# Patient Record
Sex: MALE | Race: WHITE | NOT HISPANIC OR LATINO | Employment: FULL TIME | URBAN - METROPOLITAN AREA
[De-identification: names, ages, dates, MRNs, and addresses within clinical notes are randomized per-mention and may not be internally consistent; named-entity substitution may affect disease eponyms.]

---

## 2020-12-09 ENCOUNTER — HOSPITAL ENCOUNTER (EMERGENCY)
Facility: MEDICAL CENTER | Age: 30
End: 2020-12-09
Attending: EMERGENCY MEDICINE | Admitting: EMERGENCY MEDICINE
Payer: OTHER GOVERNMENT

## 2020-12-09 VITALS
RESPIRATION RATE: 18 BRPM | HEIGHT: 75 IN | OXYGEN SATURATION: 97 % | HEART RATE: 89 BPM | SYSTOLIC BLOOD PRESSURE: 122 MMHG | BODY MASS INDEX: 26.15 KG/M2 | WEIGHT: 210.32 LBS | DIASTOLIC BLOOD PRESSURE: 74 MMHG | TEMPERATURE: 99.7 F

## 2020-12-09 DIAGNOSIS — Z20.822 SUSPECTED 2019 NOVEL CORONAVIRUS INFECTION: ICD-10-CM

## 2020-12-09 DIAGNOSIS — R05.9 COUGH: ICD-10-CM

## 2020-12-09 LAB
ALBUMIN SERPL BCP-MCNC: 4.4 G/DL (ref 3.2–4.9)
ALBUMIN/GLOB SERPL: 1.7 G/DL
ALP SERPL-CCNC: 61 U/L (ref 30–99)
ALT SERPL-CCNC: 25 U/L (ref 2–50)
ANION GAP SERPL CALC-SCNC: 10 MMOL/L (ref 7–16)
AST SERPL-CCNC: 17 U/L (ref 12–45)
BASOPHILS # BLD AUTO: 0.7 % (ref 0–1.8)
BASOPHILS # BLD: 0.03 K/UL (ref 0–0.12)
BILIRUB SERPL-MCNC: 0.6 MG/DL (ref 0.1–1.5)
BUN SERPL-MCNC: 12 MG/DL (ref 8–22)
CALCIUM SERPL-MCNC: 9.2 MG/DL (ref 8.5–10.5)
CHLORIDE SERPL-SCNC: 100 MMOL/L (ref 96–112)
CO2 SERPL-SCNC: 27 MMOL/L (ref 20–33)
COVID ORDER STATUS COVID19: NORMAL
CREAT SERPL-MCNC: 0.91 MG/DL (ref 0.5–1.4)
EOSINOPHIL # BLD AUTO: 0.06 K/UL (ref 0–0.51)
EOSINOPHIL NFR BLD: 1.5 % (ref 0–6.9)
ERYTHROCYTE [DISTWIDTH] IN BLOOD BY AUTOMATED COUNT: 41.2 FL (ref 35.9–50)
GLOBULIN SER CALC-MCNC: 2.6 G/DL (ref 1.9–3.5)
GLUCOSE SERPL-MCNC: 95 MG/DL (ref 65–99)
HCT VFR BLD AUTO: 43.9 % (ref 42–52)
HGB BLD-MCNC: 15 G/DL (ref 14–18)
IMM GRANULOCYTES # BLD AUTO: 0.01 K/UL (ref 0–0.11)
IMM GRANULOCYTES NFR BLD AUTO: 0.2 % (ref 0–0.9)
LYMPHOCYTES # BLD AUTO: 0.54 K/UL (ref 1–4.8)
LYMPHOCYTES NFR BLD: 13.4 % (ref 22–41)
MCH RBC QN AUTO: 30.7 PG (ref 27–33)
MCHC RBC AUTO-ENTMCNC: 34.2 G/DL (ref 33.7–35.3)
MCV RBC AUTO: 90 FL (ref 81.4–97.8)
MONOCYTES # BLD AUTO: 0.83 K/UL (ref 0–0.85)
MONOCYTES NFR BLD AUTO: 20.6 % (ref 0–13.4)
NEUTROPHILS # BLD AUTO: 2.55 K/UL (ref 1.82–7.42)
NEUTROPHILS NFR BLD: 63.6 % (ref 44–72)
NRBC # BLD AUTO: 0 K/UL
NRBC BLD-RTO: 0 /100 WBC
PLATELET # BLD AUTO: 180 K/UL (ref 164–446)
PMV BLD AUTO: 10.8 FL (ref 9–12.9)
POTASSIUM SERPL-SCNC: 3.5 MMOL/L (ref 3.6–5.5)
PROT SERPL-MCNC: 7 G/DL (ref 6–8.2)
RBC # BLD AUTO: 4.88 M/UL (ref 4.7–6.1)
SALICYLATES SERPL-MCNC: <1 MG/DL (ref 15–25)
SARS-COV-2 RNA RESP QL NAA+PROBE: DETECTED
SODIUM SERPL-SCNC: 137 MMOL/L (ref 135–145)
SPECIMEN SOURCE: ABNORMAL
WBC # BLD AUTO: 4 K/UL (ref 4.8–10.8)

## 2020-12-09 PROCEDURE — 80307 DRUG TEST PRSMV CHEM ANLYZR: CPT

## 2020-12-09 PROCEDURE — 85025 COMPLETE CBC W/AUTO DIFF WBC: CPT

## 2020-12-09 PROCEDURE — 700111 HCHG RX REV CODE 636 W/ 250 OVERRIDE (IP): Performed by: EMERGENCY MEDICINE

## 2020-12-09 PROCEDURE — 80053 COMPREHEN METABOLIC PANEL: CPT

## 2020-12-09 PROCEDURE — 96374 THER/PROPH/DIAG INJ IV PUSH: CPT

## 2020-12-09 PROCEDURE — U0003 INFECTIOUS AGENT DETECTION BY NUCLEIC ACID (DNA OR RNA); SEVERE ACUTE RESPIRATORY SYNDROME CORONAVIRUS 2 (SARS-COV-2) (CORONAVIRUS DISEASE [COVID-19]), AMPLIFIED PROBE TECHNIQUE, MAKING USE OF HIGH THROUGHPUT TECHNOLOGIES AS DESCRIBED BY CMS-2020-01-R: HCPCS

## 2020-12-09 PROCEDURE — 99284 EMERGENCY DEPT VISIT MOD MDM: CPT

## 2020-12-09 PROCEDURE — 700105 HCHG RX REV CODE 258: Performed by: EMERGENCY MEDICINE

## 2020-12-09 PROCEDURE — C9803 HOPD COVID-19 SPEC COLLECT: HCPCS | Performed by: EMERGENCY MEDICINE

## 2020-12-09 RX ORDER — SODIUM CHLORIDE, SODIUM LACTATE, POTASSIUM CHLORIDE, CALCIUM CHLORIDE 600; 310; 30; 20 MG/100ML; MG/100ML; MG/100ML; MG/100ML
INJECTION, SOLUTION INTRAVENOUS CONTINUOUS
Status: DISCONTINUED | OUTPATIENT
Start: 2020-12-09 | End: 2020-12-09 | Stop reason: HOSPADM

## 2020-12-09 RX ORDER — ONDANSETRON 2 MG/ML
4 INJECTION INTRAMUSCULAR; INTRAVENOUS ONCE
Status: COMPLETED | OUTPATIENT
Start: 2020-12-09 | End: 2020-12-09

## 2020-12-09 RX ORDER — ONDANSETRON 4 MG/1
4 TABLET, ORALLY DISINTEGRATING ORAL EVERY 6 HOURS PRN
Qty: 20 TAB | Refills: 0 | Status: SHIPPED | OUTPATIENT
Start: 2020-12-09

## 2020-12-09 RX ADMIN — ONDANSETRON 4 MG: 2 INJECTION INTRAMUSCULAR; INTRAVENOUS at 14:45

## 2020-12-09 RX ADMIN — SODIUM CHLORIDE, POTASSIUM CHLORIDE, SODIUM LACTATE AND CALCIUM CHLORIDE 1000 ML: 600; 310; 30; 20 INJECTION, SOLUTION INTRAVENOUS at 14:45

## 2020-12-09 NOTE — ED TRIAGE NOTES
Chief Complaint   Patient presents with   • Cough     dry coughing this morning   • N/V     this am   • Generalized Body Aches     this am   • Fever     subjective fever x 2 days. arrived in triage w/temp of 100.4     Able to speak in full sentences. Pt had exposure to his friend who tested positive for covid. Placed in senior lounge room.

## 2020-12-09 NOTE — ED PROVIDER NOTES
ED Provider Note    ER PROVIDER NOTE        CHIEF COMPLAINT  Chief Complaint   Patient presents with   • Cough     dry coughing this morning   • N/V     this am   • Generalized Body Aches     this am   • Fever     subjective fever x 2 days. arrived in triage w/temp of 100.4       HPI  Jacky Covarrubias is a 30 y.o. male who presents to the emergency department complaining of dry cough and body aches.  Patient reports that his symptoms began 2 days ago is also had some fever.  He was recently exposed to Covid as well.  He has no real shortness of breath, no chest pain.  No leg pain or swelling.  He had some intermittent nausea as well as a few episodes of emesis, nonbloody.  No abdominal pain, no diarrhea.      As a secondary complaint the patient does report that his ears have been ringing.  He recently ran out of his omeprazole so he started drinking bottles of Pepto rather than his omeprazole.     REVIEW OF SYSTEMS  Pertinent positives include ringing in ears, cough body aches. Pertinent negatives include no chest pain. See HPI for details. All other systems reviewed and are negative.    PAST MEDICAL HISTORY       SURGICAL HISTORY  patient denies any surgical history    FAMILY HISTORY  No family history on file.    SOCIAL HISTORY  Social History     Socioeconomic History   • Marital status: Single     Spouse name: Not on file   • Number of children: Not on file   • Years of education: Not on file   • Highest education level: Not on file   Occupational History   • Not on file   Social Needs   • Financial resource strain: Not on file   • Food insecurity     Worry: Not on file     Inability: Not on file   • Transportation needs     Medical: Not on file     Non-medical: Not on file   Tobacco Use   • Smoking status: Never Smoker   Substance and Sexual Activity   • Alcohol use: Yes     Comment: Socially   • Drug use: No   • Sexual activity: Not on file   Lifestyle   • Physical activity     Days per week: Not on file  "    Minutes per session: Not on file   • Stress: Not on file   Relationships   • Social connections     Talks on phone: Not on file     Gets together: Not on file     Attends [employer] service: Not on file     Active member of club or organization: Not on file     Attends meetings of clubs or organizations: Not on file     Relationship status: Not on file   • Intimate partner violence     Fear of current or ex partner: Not on file     Emotionally abused: Not on file     Physically abused: Not on file     Forced sexual activity: Not on file   Other Topics Concern   • Not on file   Social History Narrative   • Not on file      Social History     Substance and Sexual Activity   Drug Use No       CURRENT MEDICATIONS  Home Medications     Reviewed by Ny Moore R.N. (Registered Nurse) on 12/09/20 at 1241  Med List Status: Complete   Medication Last Dose Status        Patient Jose Taking any Medications                       ALLERGIES  No Known Allergies    PHYSICAL EXAM  VITAL SIGNS: /80   Pulse 91   Temp 38 °C (100.4 °F) (Temporal)   Resp 15   Ht 1.905 m (6' 3\")   Wt 95.4 kg (210 lb 5.1 oz)   SpO2 98%   BMI 26.29 kg/m²   Pulse ox interpretation: I interpret this pulse ox as normal.    Constitutional: Alert in no apparent distress.  HENT: No signs of trauma, Bilateral external ears normal, Nose normal.   Eyes: Pupils are equal and reactive, Conjunctiva normal, Non-icteric.   Neck: Normal range of motion, No tenderness, Supple, No stridor.   Lymphatic: No lymphadenopathy noted.   Cardiovascular: Regular rate and rhythm, no murmurs.   Thorax & Lungs: Normal breath sounds, No respiratory distress, No wheezing, No chest tenderness.   Abdomen: Bowel sounds normal, Soft, No tenderness, No masses, No pulsatile masses. No peritoneal signs.  Skin: Warm, Dry, No erythema, No rash.   Back: No bony tenderness, No CVA tenderness.   Extremities: Intact distal pulses, No edema, No tenderness, No cyanosis, Negative " Lisbeth's sign.  Musculoskeletal: Good range of motion in all major joints. No tenderness to palpation or major deformities noted.   Neurologic: Alert , Normal motor function, Normal sensory function, No focal deficits noted.   Psychiatric: Affect normal, Judgment normal, Mood normal.     DIAGNOSTIC STUDIES / PROCEDURES        LABS  Labs Reviewed   COVID/SARS COV-2   CBC WITH DIFFERENTIAL   COMP METABOLIC PANEL   SALICYLATE       All labs reviewed by me.    RADIOLOGY  No orders to display     The radiologist's interpretation of all radiological studies have been reviewed and images independently viewed by me.    COURSE & MEDICAL DECISION MAKING  Nursing notes, VS, PMSFHx reviewed in chart.    1:30 PM Patient seen and examined at bedside. Patient will be treated with IVF, zofran. Ordered for Covid outpatient testing metabolic panel, salicylates to evaluate his symptoms.     HYDRATION: Based on the patient's presentation of Acute Vomiting the patient was given IV fluids. IV Hydration was used because oral hydration was not as rapid as required. Upon recheck following hydration, the patient was Improved.     2:37 PM    Patient's care will be signed over to Dr. Singh at this point    Decision Making:  This is a 30 y.o. male presented with cough body aches and fevers.  Symptoms are consistent with COVID-19, and outpatient testing has been sent.  Patient is overall well-appearing, is not hypoxemic and has appropriate vital signs.  He has a normal work of breathing.  However secondarily the patient did report that he has been drinking bottles of Pepto-Bismol, which has salicylate in it, because he ran out of his omeprazole.  As he does have some ringing of ears, will check salicylate level to check for salicylate toxicity.  If this is negative I would anticipate his discharge        FINAL IMPRESSION  1. Cough    2. Suspected 2019 novel coronavirus infection         The note accurately reflects work and decisions made by  me.  Junior Wheatley M.D.  12/9/2020  2:49 PM

## 2020-12-10 NOTE — ED PROVIDER NOTES
ED PROVIDER NOTE    Scribed for Reid Singh M.D. by Jeffery Shook. 12/9/2020, 5:01 PM.    This is an addendum to the note on Jacky Covarrubias. For further details and full chart entry, see the previously signed ED Provider Note written by Dr. Wheatley (ERP).      2:01 PM - I discussed the patient's case with Dr. Wheatley (ERP) who will transfer care of the patient to me at this time.        4:33 PM  - Updated the patient that we are still waiting on one lab value at this time. If this value comes back normal then discharge. Patient verbally understands and agrees to plan of care that this time.     Results for orders placed or performed during the hospital encounter of 12/09/20   CBC WITH DIFFERENTIAL   Result Value Ref Range    WBC 4.0 (L) 4.8 - 10.8 K/uL    RBC 4.88 4.70 - 6.10 M/uL    Hemoglobin 15.0 14.0 - 18.0 g/dL    Hematocrit 43.9 42.0 - 52.0 %    MCV 90.0 81.4 - 97.8 fL    MCH 30.7 27.0 - 33.0 pg    MCHC 34.2 33.7 - 35.3 g/dL    RDW 41.2 35.9 - 50.0 fL    Platelet Count 180 164 - 446 K/uL    MPV 10.8 9.0 - 12.9 fL    Neutrophils-Polys 63.60 44.00 - 72.00 %    Lymphocytes 13.40 (L) 22.00 - 41.00 %    Monocytes 20.60 (H) 0.00 - 13.40 %    Eosinophils 1.50 0.00 - 6.90 %    Basophils 0.70 0.00 - 1.80 %    Immature Granulocytes 0.20 0.00 - 0.90 %    Nucleated RBC 0.00 /100 WBC    Neutrophils (Absolute) 2.55 1.82 - 7.42 K/uL    Lymphs (Absolute) 0.54 (L) 1.00 - 4.80 K/uL    Monos (Absolute) 0.83 0.00 - 0.85 K/uL    Eos (Absolute) 0.06 0.00 - 0.51 K/uL    Baso (Absolute) 0.03 0.00 - 0.12 K/uL    Immature Granulocytes (abs) 0.01 0.00 - 0.11 K/uL    NRBC (Absolute) 0.00 K/uL   COMP METABOLIC PANEL   Result Value Ref Range    Sodium 137 135 - 145 mmol/L    Potassium 3.5 (L) 3.6 - 5.5 mmol/L    Chloride 100 96 - 112 mmol/L    Co2 27 20 - 33 mmol/L    Anion Gap 10.0 7.0 - 16.0    Glucose 95 65 - 99 mg/dL    Bun 12 8 - 22 mg/dL    Creatinine 0.91 0.50 - 1.40 mg/dL    Calcium 9.2 8.5 - 10.5 mg/dL    AST(SGOT)  17 12 - 45 U/L    ALT(SGPT) 25 2 - 50 U/L    Alkaline Phosphatase 61 30 - 99 U/L    Total Bilirubin 0.6 0.1 - 1.5 mg/dL    Albumin 4.4 3.2 - 4.9 g/dL    Total Protein 7.0 6.0 - 8.2 g/dL    Globulin 2.6 1.9 - 3.5 g/dL    A-G Ratio 1.7 g/dL   Salicylate   Result Value Ref Range    Salicylates, Quant. <1 (L) 15 - 25 mg/dL   COVID/SARS CoV-2 PCR    Specimen: Nasopharyngeal; Respirate   Result Value Ref Range    COVID Order Status Received    ESTIMATED GFR   Result Value Ref Range    GFR If African American >60 >60 mL/min/1.73 m 2    GFR If Non African American >60 >60 mL/min/1.73 m 2   SARS-CoV-2, PCR (In-House)   Result Value Ref Range    SARS-CoV-2 Source NP Swab     SARS-CoV-2 by PCR DETECTED (AA)    Presented for evaluation.  Dr. Wheatley was concerned for the possibility of salicylate overdose because the patient was complaining of ringing the ears but he is having a fever.  Over 19 was sent at the time of discharge it was not back at the time of this dictation appears that the patient is Covid positive.  I did give the patient Covid instructions.  At this point initially my concern was that this was a viral illness but does appear that is Covid.  I patient does have access to my chart and will be notified of his positive status.  At this point I recommended for social isolation masking.  Tylenol for fever control fluids.  The patient should return if there is any worsening symptoms.      FINAL IMPRESSION   1. Cough     2. Suspected 2019 novel coronavirus infection          Jeffery ARROYO (Reji), am scribing for, and in the presence of, Reid Singh M.D..    Electronically signed by: Jeffery Mccann), 12/9/2020    Reid ARROYO M.D. personally performed the services described in this documentation, as scribed by Jeffery Shook in my presence, and it is both accurate and complete.    The note accurately reflects work and decisions made by me.  Reid Singh M.D.  12/9/2020  9:20 PM

## 2021-03-12 ENCOUNTER — OFFICE VISIT (OUTPATIENT)
Dept: URGENT CARE | Facility: CLINIC | Age: 31
End: 2021-03-12

## 2021-03-12 VITALS
HEART RATE: 88 BPM | BODY MASS INDEX: 28.45 KG/M2 | SYSTOLIC BLOOD PRESSURE: 114 MMHG | TEMPERATURE: 99.2 F | WEIGHT: 228.8 LBS | RESPIRATION RATE: 16 BRPM | HEIGHT: 75 IN | OXYGEN SATURATION: 96 % | DIASTOLIC BLOOD PRESSURE: 70 MMHG

## 2021-03-12 DIAGNOSIS — L03.311 CELLULITIS OF ABDOMINAL WALL: ICD-10-CM

## 2021-03-12 PROCEDURE — 99203 OFFICE O/P NEW LOW 30 MIN: CPT | Performed by: PHYSICIAN ASSISTANT

## 2021-03-12 RX ORDER — DOXYCYCLINE HYCLATE 100 MG
100 TABLET ORAL 2 TIMES DAILY
Qty: 14 TABLET | Refills: 0 | Status: SHIPPED | OUTPATIENT
Start: 2021-03-12 | End: 2021-03-19

## 2021-03-12 ASSESSMENT — FIBROSIS 4 INDEX: FIB4 SCORE: .5666666666666666667

## 2021-03-15 ASSESSMENT — ENCOUNTER SYMPTOMS
COUGH: 0
SORE THROAT: 0
FEVER: 0
VOMITING: 0
DIARRHEA: 0
CHANGE IN BOWEL HABIT: 0

## 2021-03-15 NOTE — PROGRESS NOTES
"Subjective:      Jacky Covarrubias is a 30 y.o. male who presents with Spider Bite (on the stomach, swollen  x 2 days )            Patient is a 30-year-old male who presents to urgent care with his girlfriend who also provides history today.  Patient suspects spider bite or other bite of some sort to his left lower abdomen.  He reports a \"pimple-like lesion \"2 to 3 days ago of which over the last few days the area has become more red, hard and swollen.  Denies specific drainage from the site.  Denies prior history of same.  Denies any fevers, chills or body aches.  Denies prior history of MRSA that he is aware of.    Other  This is a new problem. Episode onset: 2-3 days ago. The problem occurs constantly. The problem has been gradually worsening. Pertinent negatives include no change in bowel habit, congestion, coughing, fever, rash, sore throat or vomiting. Exacerbated by: Pressure over the region.  He has tried nothing for the symptoms.       Review of Systems   Constitutional: Negative for fever.   HENT: Negative for congestion and sore throat.    Respiratory: Negative for cough.    Gastrointestinal: Negative for change in bowel habit, diarrhea and vomiting.   Skin: Negative for itching and rash.   All other systems reviewed and are negative.         Objective:     /70 (BP Location: Left arm, Patient Position: Sitting, BP Cuff Size: Adult)   Pulse 88   Temp 37.3 °C (99.2 °F) (Temporal)   Resp 16   Ht 1.905 m (6' 3\")   Wt 104 kg (228 lb 12.8 oz)   SpO2 96%   BMI 28.60 kg/m²    PMH:  has no past medical history on file.  MEDS: Reviewed .   ALLERGIES: No Known Allergies  SURGHX: No past surgical history on file.  SOCHX:  reports that he has never smoked. He does not have any smokeless tobacco history on file. He reports current alcohol use. He reports that he does not use drugs.  FH: Family history was reviewed, no pertinent findings to report    Physical Exam  Vitals reviewed.   Constitutional:      "  General: He is not in acute distress.     Appearance: He is well-developed.   HENT:      Head: Normocephalic and atraumatic.   Eyes:      Conjunctiva/sclera: Conjunctivae normal.      Pupils: Pupils are equal, round, and reactive to light.   Neck:      Trachea: No tracheal deviation.   Cardiovascular:      Rate and Rhythm: Normal rate and regular rhythm.      Heart sounds: No murmur.   Pulmonary:      Effort: Pulmonary effort is normal. No respiratory distress.   Abdominal:          Comments: Small 2 cm raised erythematous papule-with surrounding induration.  No evidence of fluctuance or noted streaking.  Slight increased warmth.  Lesion is firm.  Without evidence of necrosis.   Musculoskeletal:         General: Normal range of motion.      Cervical back: Normal range of motion and neck supple.   Skin:     General: Skin is warm.      Findings: No rash.   Neurological:      Mental Status: He is alert and oriented to person, place, and time.      Coordination: Coordination normal.   Psychiatric:         Behavior: Behavior normal.         Thought Content: Thought content normal.         Judgment: Judgment normal.                 Assessment/Plan:        1. Cellulitis of abdominal wall  - doxycycline (VIBRAMYCIN) 100 MG Tab; Take 1 tablet by mouth 2 times a day for 7 days.  Dispense: 14 tablet; Refill: 0    Discussed etiology of which could be bite of some sort although without identification of insect, spider etc. we will still treat the same.  We will start the patient on the above.  No indication for I&D although discussed signs and symptoms that would require such.  Encouraged patient to utilize warm hot compresses 2-3 times a day.  Appropriate PPE worn at all times by provider.   Pt. Had face mask on throughout entirety of the visit other than oropharyngeal examination today.     Side effects of OTC or prescribed medications discussed.     DDX, Supportive care, and indications for immediate follow-up discussed with  patient.    Instructed to return to clinic or nearest emergency department if we are not available for any change in condition, further concerns, or worsening of symptoms.    The patient and/or guardian demonstrated a good understanding and agreed with the treatment plan.    Please note that this dictation was created using voice recognition software. I have made every reasonable attempt to correct obvious errors, but I expect that there are errors of grammar and possibly content that I did not discover before finalizing the note.

## 2021-11-11 ENCOUNTER — HOSPITAL ENCOUNTER (EMERGENCY)
Facility: MEDICAL CENTER | Age: 31
End: 2021-11-11
Attending: EMERGENCY MEDICINE
Payer: OTHER GOVERNMENT

## 2021-11-11 ENCOUNTER — APPOINTMENT (OUTPATIENT)
Dept: RADIOLOGY | Facility: MEDICAL CENTER | Age: 31
End: 2021-11-11
Attending: EMERGENCY MEDICINE
Payer: OTHER GOVERNMENT

## 2021-11-11 VITALS
HEIGHT: 75 IN | SYSTOLIC BLOOD PRESSURE: 102 MMHG | TEMPERATURE: 98 F | OXYGEN SATURATION: 94 % | RESPIRATION RATE: 18 BRPM | WEIGHT: 235 LBS | DIASTOLIC BLOOD PRESSURE: 56 MMHG | HEART RATE: 64 BPM | BODY MASS INDEX: 29.22 KG/M2

## 2021-11-11 DIAGNOSIS — L03.116 CELLULITIS OF LEFT LOWER EXTREMITY: ICD-10-CM

## 2021-11-11 LAB
ANION GAP SERPL CALC-SCNC: 9 MMOL/L (ref 7–16)
BASOPHILS # BLD AUTO: 0.6 % (ref 0–1.8)
BASOPHILS # BLD: 0.05 K/UL (ref 0–0.12)
BUN SERPL-MCNC: 13 MG/DL (ref 8–22)
CALCIUM SERPL-MCNC: 9.3 MG/DL (ref 8.5–10.5)
CHLORIDE SERPL-SCNC: 105 MMOL/L (ref 96–112)
CO2 SERPL-SCNC: 25 MMOL/L (ref 20–33)
CREAT SERPL-MCNC: 0.85 MG/DL (ref 0.5–1.4)
EOSINOPHIL # BLD AUTO: 0.43 K/UL (ref 0–0.51)
EOSINOPHIL NFR BLD: 5.4 % (ref 0–6.9)
ERYTHROCYTE [DISTWIDTH] IN BLOOD BY AUTOMATED COUNT: 38.4 FL (ref 35.9–50)
GLUCOSE SERPL-MCNC: 89 MG/DL (ref 65–99)
HCT VFR BLD AUTO: 44 % (ref 42–52)
HGB BLD-MCNC: 15.4 G/DL (ref 14–18)
IMM GRANULOCYTES # BLD AUTO: 0.03 K/UL (ref 0–0.11)
IMM GRANULOCYTES NFR BLD AUTO: 0.4 % (ref 0–0.9)
LACTATE BLD-SCNC: 1.4 MMOL/L (ref 0.5–2)
LYMPHOCYTES # BLD AUTO: 1.83 K/UL (ref 1–4.8)
LYMPHOCYTES NFR BLD: 23.1 % (ref 22–41)
MCH RBC QN AUTO: 30.3 PG (ref 27–33)
MCHC RBC AUTO-ENTMCNC: 35 G/DL (ref 33.7–35.3)
MCV RBC AUTO: 86.6 FL (ref 81.4–97.8)
MONOCYTES # BLD AUTO: 0.4 K/UL (ref 0–0.85)
MONOCYTES NFR BLD AUTO: 5 % (ref 0–13.4)
NEUTROPHILS # BLD AUTO: 5.19 K/UL (ref 1.82–7.42)
NEUTROPHILS NFR BLD: 65.5 % (ref 44–72)
NRBC # BLD AUTO: 0 K/UL
NRBC BLD-RTO: 0 /100 WBC
PLATELET # BLD AUTO: 222 K/UL (ref 164–446)
PMV BLD AUTO: 10 FL (ref 9–12.9)
POTASSIUM SERPL-SCNC: 4.2 MMOL/L (ref 3.6–5.5)
RBC # BLD AUTO: 5.08 M/UL (ref 4.7–6.1)
SODIUM SERPL-SCNC: 139 MMOL/L (ref 135–145)
WBC # BLD AUTO: 7.9 K/UL (ref 4.8–10.8)

## 2021-11-11 PROCEDURE — 96365 THER/PROPH/DIAG IV INF INIT: CPT

## 2021-11-11 PROCEDURE — 700111 HCHG RX REV CODE 636 W/ 250 OVERRIDE (IP): Performed by: EMERGENCY MEDICINE

## 2021-11-11 PROCEDURE — 83605 ASSAY OF LACTIC ACID: CPT

## 2021-11-11 PROCEDURE — 99284 EMERGENCY DEPT VISIT MOD MDM: CPT

## 2021-11-11 PROCEDURE — 80048 BASIC METABOLIC PNL TOTAL CA: CPT

## 2021-11-11 PROCEDURE — 700105 HCHG RX REV CODE 258: Performed by: EMERGENCY MEDICINE

## 2021-11-11 PROCEDURE — 85025 COMPLETE CBC W/AUTO DIFF WBC: CPT

## 2021-11-11 PROCEDURE — 73630 X-RAY EXAM OF FOOT: CPT | Mod: LT

## 2021-11-11 RX ORDER — AMOXICILLIN AND CLAVULANATE POTASSIUM 875; 125 MG/1; MG/1
1 TABLET, FILM COATED ORAL 2 TIMES DAILY
Qty: 14 TABLET | Refills: 0 | Status: SHIPPED | OUTPATIENT
Start: 2021-11-11 | End: 2021-11-18

## 2021-11-11 RX ADMIN — SODIUM CHLORIDE 3 G: 900 INJECTION INTRAVENOUS at 11:43

## 2021-11-11 ASSESSMENT — FIBROSIS 4 INDEX: FIB4 SCORE: .5666666666666666667

## 2021-11-11 NOTE — ED NOTES
Pt provided with crutches and educated on use. Pt verbalized understanding and able to demonstrate proper use. Discharge instructions, prescriptions, and follow-up reviewed with pt. Pt verbalized understanding. Denies questions at this time. Pt ambulatory at discharge with crutches.

## 2021-11-11 NOTE — DISCHARGE INSTRUCTIONS
Return to the ER tomorrow for mandatory recheck.    Return to the ER immediately for any worsening pain, worsening swelling, for redness that is expanding beyond the pen markings, for red streaks up your leg, numbness, tingling, blistering of your skin, fevers over 100.4, shaking chills, nausea, vomiting, worsening swelling of your foot or leg, or for any concerns.    Use crutches to avoid weightbearing.    Elevate leg is much as possible.  This will help with the swelling and the pain.    Use ice.  This will help with the swelling and the pain.    Follow-up with the [facility] within the next 1 to 2 days.  Please call today to schedule your follow-up appointment.    Take over-the-counter Tylenol and ibuprofen for discomfort.

## 2021-11-11 NOTE — ED TRIAGE NOTES
"Chief Complaint   Patient presents with   • Foot Pain     stiffness, swelling, pain x4 days to LT foot. denies wound. denies injury. possibly from wearing steel toe boots that were too small.     Pt to triage for above via personal walker.     /81   Pulse 96   Temp 36.7 °C (98.1 °F) (Temporal)   Resp 16   Ht 1.905 m (6' 3\")   Wt 107 kg (235 lb)   SpO2 98%   BMI 29.37 kg/m²     "

## 2021-11-12 ENCOUNTER — HOSPITAL ENCOUNTER (EMERGENCY)
Facility: MEDICAL CENTER | Age: 31
End: 2021-11-12
Attending: EMERGENCY MEDICINE
Payer: OTHER GOVERNMENT

## 2021-11-12 VITALS
DIASTOLIC BLOOD PRESSURE: 74 MMHG | HEART RATE: 82 BPM | TEMPERATURE: 97.8 F | HEIGHT: 75 IN | OXYGEN SATURATION: 96 % | RESPIRATION RATE: 18 BRPM | SYSTOLIC BLOOD PRESSURE: 127 MMHG | BODY MASS INDEX: 28.78 KG/M2 | WEIGHT: 231.48 LBS

## 2021-11-12 DIAGNOSIS — Z51.89 ENCOUNTER FOR WOUND RE-CHECK: ICD-10-CM

## 2021-11-12 PROCEDURE — 700102 HCHG RX REV CODE 250 W/ 637 OVERRIDE(OP): Performed by: EMERGENCY MEDICINE

## 2021-11-12 PROCEDURE — A9270 NON-COVERED ITEM OR SERVICE: HCPCS | Performed by: EMERGENCY MEDICINE

## 2021-11-12 PROCEDURE — 99283 EMERGENCY DEPT VISIT LOW MDM: CPT

## 2021-11-12 RX ORDER — AMOXICILLIN AND CLAVULANATE POTASSIUM 875; 125 MG/1; MG/1
1 TABLET, FILM COATED ORAL ONCE
Status: COMPLETED | OUTPATIENT
Start: 2021-11-12 | End: 2021-11-12

## 2021-11-12 RX ADMIN — AMOXICILLIN AND CLAVULANATE POTASSIUM 1 TABLET: 875; 125 TABLET, FILM COATED ORAL at 12:59

## 2021-11-12 ASSESSMENT — ENCOUNTER SYMPTOMS
FEVER: 0
NAUSEA: 0
CHILLS: 0
VOMITING: 0

## 2021-11-12 ASSESSMENT — FIBROSIS 4 INDEX: FIB4 SCORE: 0.46

## 2021-11-12 NOTE — ED TRIAGE NOTES
Pt amb to triage w/ crutches.  Chief Complaint   Patient presents with   • Wound Re-Check     left foot     Pt was seen for the same last night, told to come back for recheck.  Pt reports foot looks about the same. States he is compliant w/ abx.

## 2021-11-12 NOTE — ED PROVIDER NOTES
ED Provider Note    Scribed for Raymon Hill M.D. by Rodolfo Reid. 11/12/2021, 12:39 PM.    Primary care provider: Pcp Pt States None  Means of arrival: Walk-in  History obtained from: Patient  History limited by: None    CHIEF COMPLAINT  Chief Complaint   Patient presents with   • Wound Re-Check     left foot       HPI  Jacky Covarrubias is a 30 y.o. male who presents to the Emergency Department for left foot wound recheck onset 6 days ago. Patient states he was wearing steel toed boots at work and notes they were too small for them. He reports he was previously seen in the ED yesterday for similar symptoms and was prescribed Augmentin. He notes he is picking up his Augmentin presciption today, but notes he has been taking his girlfriend's antibiotics. He noticed his swelling and redness worsened today, which prompted him to return to the ED for reevaluation of his symptoms. He reports associated mild to moderate left foot pain, swelling, and redness, but denies any  fevers, chills, nausea, or vomiting. No alleviating or exacerbating factors noted.  No allergies to medications. Patient denies any major past medical history and does not smoke or drink.      REVIEW OF SYSTEMS  Review of Systems   Constitutional: Negative for chills and fever.   Gastrointestinal: Negative for nausea and vomiting.   Musculoskeletal:        Positive for left foot pain, swelling, and redness       PAST MEDICAL HISTORY       SURGICAL HISTORY  patient denies any surgical history    SOCIAL HISTORY  Social History     Tobacco Use   • Smoking status: Never Smoker   • Smokeless tobacco: Never Used   Substance Use Topics   • Alcohol use: Yes     Comment: Socially   • Drug use: No      Social History     Substance and Sexual Activity   Drug Use No       FAMILY HISTORY  No major family history noted.    CURRENT MEDICATIONS  Home Medications    **Home medications have not yet been reviewed for this encounter**         ALLERGIES  No Known  "Allergies    PHYSICAL EXAM  VITAL SIGNS: /79   Pulse (!) 102   Temp 36.1 °C (97 °F) (Temporal)   Resp 16   Ht 1.905 m (6' 3\")   Wt 105 kg (231 lb 7.7 oz)   SpO2 94%   BMI 28.93 kg/m²   Vitals reviewed.  Constitutional: Well developed, Well nourished, No acute distress, Non-toxic appearance.   HENT: Normocephalic, Atraumatic,    Cardiovascular: Normal heart rate, Normal rhythm, No murmurs, No rubs, No gallops.   Thorax & Lungs: Normal breath sounds, No respiratory distress, No wheezing,    Skin: Left foot: Left foot has normal neurovascular.Erythema at the left dorsum, Slight purplish coloration in the 4th and 5th left toe space. Warm, Dry,  No rash.   Back: No tenderness, No CVA tenderness.   Musculoskeletal:  Good range of motion in all major joints. Left foot is mildly swollen on the dorsum.  Neurologic: Alert, No focal deficits noted.   Psychiatric: Affect normal    COURSE & MEDICAL DECISION MAKING  Pertinent Labs & Imaging studies reviewed. (See chart for details)    12:39 PM Patient seen and examined at bedside. The patient presents with left foot pain, and the differential diagnosis includes but is not limited to cellulitis, possibly progressing versus not progressing.. Patient will be treated with Augmentin for his symptoms. The plan for discharge was discussed. Patient was given the opportunity to ask any questions. Patient verbalizes understanding and agreement to this plan of care.         The patient states his pain is about the same.  He is not picked up his antibiotics please be taking his girlfriends with antibiotics.  We will give a dose of Augmentin as prescribed.  He is counseled to fill this antibiotics.  Return for more pain swelling redness.  He is to keep his foot elevated.  His questions were answered is agreeable to plan he is discharged in good condition.      The patient will return for new or worsening symptoms and is stable at the time of discharge.    The patient is referred " to a primary physician for blood pressure management, diabetic screening, and for all other preventative health concerns.    DISPOSITION:  Patient will be discharged home in stable condition.    FOLLOW UP:  your    Schedule an appointment as soon as possible for a visit in 2 days        OUTPATIENT MEDICATIONS:  New Prescriptions    No medications on file        FINAL IMPRESSION  1. Encounter for wound re-check     2. Cellulitis       Rodolfo ARROYO (Scribe), am scribing for, and in the presence of, Raymon Hill M.D..    Electronically signed by: Rodolfo Reid (Davidibe), 11/12/2021    Raymon ARROYO M.D. personally performed the services described in this documentation, as scribed by Rodolfo Reid in my presence, and it is both accurate and complete.    The note accurately reflects work and decisions made by me.  Raymon Hill M.D.  11/12/2021  1:37 PM

## 2021-11-12 NOTE — ED NOTES
Pt brought back to [facility] from triage. Pt able to transfer self to bed, call light in reach. Chart up for ERP.

## 2021-11-12 NOTE — DISCHARGE INSTRUCTIONS
Return to the ER for more pain, swelling, redness, fever or other concerns. Take medications as prescribed. Follow-up with your doctor early next week. Keep foot elevated.

## 2022-02-06 PROCEDURE — 99283 EMERGENCY DEPT VISIT LOW MDM: CPT | Mod: EDC

## 2022-02-06 ASSESSMENT — FIBROSIS 4 INDEX: FIB4 SCORE: 0.47

## 2022-02-07 ENCOUNTER — HOSPITAL ENCOUNTER (EMERGENCY)
Facility: MEDICAL CENTER | Age: 32
End: 2022-02-07
Attending: STUDENT IN AN ORGANIZED HEALTH CARE EDUCATION/TRAINING PROGRAM
Payer: OTHER GOVERNMENT

## 2022-02-07 VITALS
HEART RATE: 81 BPM | RESPIRATION RATE: 18 BRPM | TEMPERATURE: 98.1 F | OXYGEN SATURATION: 97 % | BODY MASS INDEX: 32.62 KG/M2 | WEIGHT: 262.35 LBS | HEIGHT: 75 IN | SYSTOLIC BLOOD PRESSURE: 114 MMHG | DIASTOLIC BLOOD PRESSURE: 86 MMHG

## 2022-02-07 DIAGNOSIS — S33.5XXA SPRAIN OF LOW BACK, INITIAL ENCOUNTER: ICD-10-CM

## 2022-02-07 DIAGNOSIS — S61.412A LACERATION OF LEFT HAND WITHOUT FOREIGN BODY, INITIAL ENCOUNTER: ICD-10-CM

## 2022-02-07 PROCEDURE — 700101 HCHG RX REV CODE 250: Performed by: STUDENT IN AN ORGANIZED HEALTH CARE EDUCATION/TRAINING PROGRAM

## 2022-02-07 PROCEDURE — A9270 NON-COVERED ITEM OR SERVICE: HCPCS | Performed by: STUDENT IN AN ORGANIZED HEALTH CARE EDUCATION/TRAINING PROGRAM

## 2022-02-07 PROCEDURE — 304999 HCHG REPAIR-SIMPLE/INTERMED LEVEL 1: Mod: EDC

## 2022-02-07 PROCEDURE — 303747 HCHG EXTRA SUTURE: Mod: EDC

## 2022-02-07 PROCEDURE — 700102 HCHG RX REV CODE 250 W/ 637 OVERRIDE(OP): Performed by: STUDENT IN AN ORGANIZED HEALTH CARE EDUCATION/TRAINING PROGRAM

## 2022-02-07 PROCEDURE — 304217 HCHG IRRIGATION SYSTEM: Mod: EDC

## 2022-02-07 PROCEDURE — 700111 HCHG RX REV CODE 636 W/ 250 OVERRIDE (IP): Performed by: STUDENT IN AN ORGANIZED HEALTH CARE EDUCATION/TRAINING PROGRAM

## 2022-02-07 PROCEDURE — 96372 THER/PROPH/DIAG INJ SC/IM: CPT | Mod: EDC,XU

## 2022-02-07 RX ORDER — KETOROLAC TROMETHAMINE 30 MG/ML
30 INJECTION, SOLUTION INTRAMUSCULAR; INTRAVENOUS ONCE
Status: COMPLETED | OUTPATIENT
Start: 2022-02-07 | End: 2022-02-07

## 2022-02-07 RX ORDER — LIDOCAINE HYDROCHLORIDE AND EPINEPHRINE 10; 10 MG/ML; UG/ML
10 INJECTION, SOLUTION INFILTRATION; PERINEURAL ONCE
Status: DISCONTINUED | OUTPATIENT
Start: 2022-02-07 | End: 2022-02-07 | Stop reason: CLARIF

## 2022-02-07 RX ORDER — BACITRACIN ZINC 500 [USP'U]/G
OINTMENT TOPICAL ONCE
Status: COMPLETED | OUTPATIENT
Start: 2022-02-07 | End: 2022-02-07

## 2022-02-07 RX ORDER — LIDOCAINE HYDROCHLORIDE AND EPINEPHRINE BITARTRATE 20; .01 MG/ML; MG/ML
10 INJECTION, SOLUTION SUBCUTANEOUS ONCE
Status: DISCONTINUED | OUTPATIENT
Start: 2022-02-07 | End: 2022-02-07

## 2022-02-07 RX ORDER — LIDOCAINE HCL/EPINEPHRINE/PF 2%-1:200K
10 VIAL (ML) INJECTION ONCE
Status: COMPLETED | OUTPATIENT
Start: 2022-02-07 | End: 2022-02-07

## 2022-02-07 RX ADMIN — KETOROLAC TROMETHAMINE 30 MG: 30 INJECTION, SOLUTION INTRAMUSCULAR at 00:55

## 2022-02-07 RX ADMIN — LIDOCAINE HYDROCHLORIDE AND EPINEPHRINE 10 ML: 20; 5 INJECTION, SOLUTION EPIDURAL; INFILTRATION; INTRACAUDAL; PERINEURAL at 00:54

## 2022-02-07 RX ADMIN — BACITRACIN ZINC: 500 OINTMENT TOPICAL at 01:22

## 2022-02-07 NOTE — ED PROVIDER NOTES
ED Provider Note    CHIEF COMPLAINT  Chief Complaint   Patient presents with   • Laceration     Patient was helping his uncle with a medal piece to a roof, reports the metal accidently cut his hand, sustained a L hand laceration. Pt has rolled gauze around his hand, bleeding controlled with th pressure. Pt reports the cut is deep, reports about 2-3in. Pt reports he thinks he is up to date on his tetanus. Patient is able to move fingers and has ROM. Denies numbness and tingling. GCS 15       HPI  Jacky Covarrubias is a 31 y.o. left handed male who presents with laceration to his left hand.  Patient was helping his uncle lift a metal piece of a roof and bending over when he cut his hand.  He states he was lifting with poor form and since after the injury has developed some left sided mid back pain as well.  He denies any numbness or tingling in his hand or his legs.  Denies any numbness or tingling in his groin.  Has not taken any pain medications.  Denies midline back pain.  States he has a history of a back injury several years ago from lifting.  Feels similar.  States his last tetanus was in the last 10 years.    REVIEW OF SYSTEMS  See HPI for further details. All other systems are negative.     PAST MEDICAL HISTORY   No chronic medical problems    SOCIAL HISTORY  Social History     Tobacco Use   • Smoking status: Never Smoker   • Smokeless tobacco: Never Used   Vaping Use   • Vaping Use: Never used   Substance and Sexual Activity   • Alcohol use: Yes     Comment: Socially   • Drug use: No   • Sexual activity: Not on file       SURGICAL HISTORY  patient denies any surgical history    CURRENT MEDICATIONS  Home Medications     Reviewed by Danielle Orr R.N. (Registered Nurse) on 02/07/22 at 0000  Med List Status: Partial   Medication Last Dose Status   ondansetron (ZOFRAN ODT) 4 MG TABLET DISPERSIBLE  Active                ALLERGIES  No Known Allergies    PHYSICAL EXAM  VITAL SIGNS: /85   Pulse  "97   Temp 36.3 °C (97.4 °F) (Temporal)   Resp 18   Ht 1.905 m (6' 3\")   Wt 119 kg (262 lb 5.6 oz)   SpO2 96% Comment: RA  BMI 32.79 kg/m²    Pulse ox interpretation: I interpret this pulse ox as normal.  Constitutional: Alert in no apparent distress.  HENT: Normocephalic, Atraumatic, Bilateral external ears normal. Nose normal.   Eyes: Pupils are equal and reactive. Conjunctiva normal, non-icteric.   Heart: Regular rate and rythm, no murmurs.    Lungs: Clear to auscultation bilaterally.  Skin: Warm, Dry, No erythema, No rash. 2 lacerations to left palm one 3cm, the other 5cm  Back: No midline C, T, L-spine tenderness, reproducible tenderness over the left paraspinal muscles of the thoracic area  Neurologic: Alert, Grossly non-focal.  Left hand with normal radian, median, ulnar nerve sensation, normal movement in flexion and extension and opposition of all fingers. Normal sensation and strength bilateral legs.  Normal gait.  Psychiatric: Affect normal, Judgment normal, Mood normal, Appears appropriate and not intoxicated.           COURSE & MEDICAL DECISION MAKING  Pertinent Labs & Imaging studies reviewed. (See chart for details)  1:17 AM  Laceration Repair Procedure    Indication: Laceration    Location/Description: left palm    Procedure: The patient was placed in the appropriate position and anesthesia around the laceration was obtained by infiltration using 1% Lidocaine with epinephrine. The area was then irrigated with tap water. Both lacerations was closed with 5-0 Ethilon using interrupted sutures. There were no additional lacerations requiring repair. The wound area was then dressed with bacitracin.      Total repaired wound length: 8 cm (3 cm, 5 cm)    Other Items: Suture count: 9    The patient tolerated the procedure well.    Complications: None      Patient presented with laceration x 2 to his left palm from a piece of metal.  He was neurovascularly intact.  No evidence of arterial bleeding.  No " foreign bodies in the wound.  Wounds were irrigated thoroughly with tap water and after anesthesia were repaired with sutures.  In addition patient also developed left-sided mid back pain and exam was consistent with spasm likely from poor lifting form.  He had no neuro deficits, no midline tenderness, direct trauma, and no signs of cauda equina.  No indication for imaging at this time.  Pain treated with Toradol.  Discharged home with return precautions.  Tetanus is up-to-date and did not require update here.    The patient will not drink alcohol nor drive with prescribed medications. The patient will return for worsening symptoms and is stable at the time of discharge. The patient verbalizes understanding and will comply.    FINAL IMPRESSION  1. Laceration of left hand without foreign body, initial encounter     2. Sprain of low back, initial encounter              Electronically signed by: Parul Cotto M.D., 2/7/2022 12:42 AM

## 2022-02-07 NOTE — ED TRIAGE NOTES
"Chief Complaint   Patient presents with   • Laceration     Patient was helping his uncle with a medal piece to a roof, reports the metal accidently cut his hand, sustained a L hand laceration. Pt has rolled gauze around his hand, bleeding controlled with th pressure. Pt reports the cut is deep, reports about 2-3in. Pt reports he thinks he is up to date on his tetanus. Patient is able to move fingers and has ROM. Denies numbness and tingling. GCS 15       32 yo M to triage for above complaint.     Pt is alert and oriented, speaking in full sentences, follows commands and responds appropriately to questions. NAD. Resp are even and unlabored.      Pt placed in lobby. Pt educated on triage process. Pt encouraged to alert staff for any changes.     Patient and staff wearing appropriate PPE    /85   Pulse 97   Temp 36.3 °C (97.4 °F) (Temporal)   Resp 18   Ht 1.905 m (6' 3\")   Wt 119 kg (262 lb 5.6 oz)   SpO2 96% Comment: RA  BMI 32.79 kg/m²   "

## 2022-02-07 NOTE — ED NOTES
"Patient roomed to room Yellow 40. Assumed care at this time.  Pt awake and alert in NAD. Pt to room with slow but steady gait. Reports he sustained a laceration to his left hand from a piece of metal approximately two hours. CMS intact. Cap refill < 3 sec. Denies fever, vomiting, diarrhea. Pt reports he has developed \"sharp lower back pain\".     Advised of NPO status.  Call light within reach.  Denies further needs at this time. Up for ERP eval.    "

## 2022-02-07 NOTE — ED NOTES
Patient is being discharged from ED to home. Discharge instructions were discussed by RN with patient and/or Family. No questions at this time. Medications were discussed with patient. VS within normal limits or have been addressed with patient and MD.     Discussed wound care and follow up in ~  2 week to get the sutures out.

## 2022-02-07 NOTE — DISCHARGE INSTRUCTIONS
As we discussed, your sutures need to come out in 12 to 14 days.  This can be done at your primary care doctor, urgent care, or the emergency department.    Keep the wound clean and use antibiotic such as bacitracin over the wound.  Keep it covered with a clean dressing to help prevent irritation and damage.

## 2022-02-21 ENCOUNTER — HOSPITAL ENCOUNTER (EMERGENCY)
Facility: MEDICAL CENTER | Age: 32
End: 2022-02-21
Payer: OTHER GOVERNMENT

## 2022-02-21 VITALS
RESPIRATION RATE: 16 BRPM | HEART RATE: 71 BPM | TEMPERATURE: 97.3 F | WEIGHT: 244.71 LBS | BODY MASS INDEX: 30.43 KG/M2 | OXYGEN SATURATION: 95 % | SYSTOLIC BLOOD PRESSURE: 147 MMHG | HEIGHT: 75 IN | DIASTOLIC BLOOD PRESSURE: 82 MMHG

## 2022-02-21 PROCEDURE — 99281 EMR DPT VST MAYX REQ PHY/QHP: CPT | Mod: EDC

## 2022-02-21 ASSESSMENT — FIBROSIS 4 INDEX: FIB4 SCORE: 0.47

## 2022-02-21 NOTE — ED TRIAGE NOTES
Pt to triage. Hand lacs healing well. Sutures removed. Wound edges well approximated, no sign of infection. Wound dressed w/ steri strips and bandaid.

## 2022-09-23 ENCOUNTER — APPOINTMENT (OUTPATIENT)
Dept: RADIOLOGY | Facility: MEDICAL CENTER | Age: 32
End: 2022-09-23
Attending: EMERGENCY MEDICINE
Payer: OTHER GOVERNMENT

## 2022-09-23 ENCOUNTER — HOSPITAL ENCOUNTER (EMERGENCY)
Facility: MEDICAL CENTER | Age: 32
End: 2022-09-23
Attending: EMERGENCY MEDICINE
Payer: OTHER GOVERNMENT

## 2022-09-23 VITALS
DIASTOLIC BLOOD PRESSURE: 72 MMHG | HEART RATE: 69 BPM | SYSTOLIC BLOOD PRESSURE: 118 MMHG | RESPIRATION RATE: 18 BRPM | WEIGHT: 242.51 LBS | BODY MASS INDEX: 30.15 KG/M2 | OXYGEN SATURATION: 98 % | HEIGHT: 75 IN | TEMPERATURE: 98 F

## 2022-09-23 DIAGNOSIS — R68.84 JAW PAIN: ICD-10-CM

## 2022-09-23 PROCEDURE — 99283 EMERGENCY DEPT VISIT LOW MDM: CPT

## 2022-09-23 PROCEDURE — 70355 PANORAMIC X-RAY OF JAWS: CPT

## 2022-09-23 ASSESSMENT — LIFESTYLE VARIABLES: DOES PATIENT WANT TO STOP DRINKING: NO

## 2022-09-23 ASSESSMENT — FIBROSIS 4 INDEX: FIB4 SCORE: 0.47

## 2022-09-23 NOTE — DISCHARGE INSTRUCTIONS
Suspect you are having right jaw pain as a result of a wisdom tooth that was seen on your x-ray.  I recommend that she follow-up with a dentist for further management.  Alternatively, this may be TMJ pain.

## 2022-09-23 NOTE — ED PROVIDER NOTES
ED Provider Note    Scribed for Moncho Diaz M.D. by Bruce Vasquez. 9/23/2022  9:02 AM    Primary care provider: Pcp Pt States None  Means of arrival: Walk-In  History obtained from: Patient  History limited by: None     CHIEF COMPLAINT  Chief Complaint   Patient presents with    Jaw Pain    Ear Pain     Pt reports right sided jaw pain that radiates to ear since Sun, pt reports difficulty/pain when he try's to chew. Pt unaware of any dental issues.        HPI  Jacky Covarrubias is a 31 y.o. male who presents to the Emergency Department for worsening right ear and right sided jaw pain onset 5 days ago. Patient states he has had no changes in his hearing. He reports he recently was given pain medication for a back injury, recently finished the course and states he then began to have right sided jaw pain. Patient states a history of dental problems, and states he has not had his lower wisdom teeth taken out. He reports that he does grind his teeth at night. Patient states he is able to open and close his mouth but there is pain during this. Patient states pain began in his right jaw and radiates to his right ear. He reports pain is exacerbated by chewing. Patient reports no other associated symptoms. There are no alleviating factors. Patient denies associated left sided jaw pain, left ear pain, fever, dental pain, ringing sensation in ears, neck pain, or cold sensation on teeth.     REVIEW OF SYSTEMS  Pertinent positives include right ear pain, right sided jaw pain. Pertinent negatives include no left sided jaw pain, left ear pain, fever, dental pain, ringing sensation in ears, neck pain, or cold sensation on teeth.     PAST MEDICAL HISTORY       SURGICAL HISTORY  patient denies any surgical history    SOCIAL HISTORY  Social History     Tobacco Use    Smoking status: Never    Smokeless tobacco: Never   Vaping Use    Vaping Use: Never used   Substance Use Topics    Alcohol use: Yes     Comment: Socially    Drug use:  "No      Social History     Substance and Sexual Activity   Drug Use No       FAMILY HISTORY  None pertinent.     CURRENT MEDICATIONS    Current Outpatient Medications   Medication Instructions    ondansetron (ZOFRAN ODT) 4 mg, Oral, EVERY 6 HOURS PRN        ALLERGIES  No Known Allergies    PHYSICAL EXAM  VITAL SIGNS: /80   Pulse 74   Temp 36.5 °C (97.7 °F) (Temporal)   Resp 18   Ht 1.905 m (6' 3\")   Wt 110 kg (242 lb 8.1 oz)   SpO2 97%   BMI 30.31 kg/m²   Pulse ox interpretation: Normal  Constitutional: Mild distress, Non-toxic appearance.   HENT: Normocephalic, Atraumatic. Tenderness to the angle of the right side of the mandible. There is no dental tenderness, no intraoral swelling, no facial swelling, no erythema. Patient has normal external auditory canals, and normal TM's.   Eyes: Conjunctiva normal, No discharge.   Neck: Normal range of motion, Supple, No stridor.   Lymphatic: No lymphadenopathy noted. No palpable lymph nodes.   Cardiovascular: Normal heart rate, Normal rhythm  Thorax & Lungs: Normal breath sounds, No respiratory distress  Skin: Warm, Dry, No erythema, No rash.       RADIOLOGY  [id number]   Final Result      1.  No acute fracture.   2.  Mild periapical lucency about the left second mandibular molar, possibly developing abscess.   3.  Some lucency about the right greater than left mandibular wisdom teeth. Recommend dental consultation.        The radiologist's interpretation of all radiological studies have been reviewed by me.    COURSE & MEDICAL DECISION MAKING  Pertinent Labs & Imaging studies reviewed. (See chart for details)    9:02 AM - Patient seen and examined at bedside. Ordered DX-Mandible Panoramic to evaluate his symptoms. Discussed plan of care with patient. I informed them that imaging will be ordered to evaluate symptoms. Patient is understanding and agreeable with plan.      10:05 AM - Patient was reevaluated at bedside. Upon reevaluation he is afebrile " and vital signs are stable. Advised patient to follow up with his primary care physician and dentist in an outpatient setting. I then informed the patient of my plan for discharge, which includes strict return precautions for any new or worsening symptoms. Patient understands and verbalizes agreement to plan of care. They were given an opportunity to ask questions. Patient is comfortable going home at this time.      Decision Making:  This is a 31 y.o. year old male who presents with right mandible pain right at the angle of the mandible.  No facial swelling or erythema.  No palpable abscess at the gumline or intraoral swelling.  No tenderness at the teeth.  He notes that the pain is worse with opening his mouth fully.  Pain radiates to his ear.  Normal external auditory canal.  Normal tympanic membrane.  No palpable lymph nodes.  Panorex was performed and there is some lucency around the right mandibular wisdom teeth.  Recommending consultation with dentist as an outpatient for this as this may be the cause of the patient's jaw pain.  TMJ is also a possibility.  Does have clicking at the TMJ with opening his mouth fully.  No prior history of dislocation of the jaw.    The patient will return for new or worsening symptoms and is stable at the time of discharge. Patient was given return precautions. Patient and/or family member verbalizes understanding and will comply.    DISPOSITION:  Patient will be discharged home in stable condition.    FOLLOW UP:  [facility], Emergency Dept  [address] [place] [id number] [phone]    As needed, If symptoms worsen    Dentist    Schedule an appointment as soon as possible for a visit       FINAL IMPRESSION  1. Jaw pain         This dictation has been created using voice recognition software and/or scribes. The accuracy of the dictation is limited by the abilities of the software and the expertise of the scribes. I expect there may be some errors  of grammar and possibly content. I made every attempt to manually correct the errors within my dictation. However, errors related to voice recognition software and/or scribes may still exist and should be interpreted within the appropriate context.     I, Bruce Vasquez (Scribe), am scribing for, and in the presence of, Moncho Diaz M.D..    Electronically signed by: Bruce Vasquez (Scribe), 9/23/2022    IMoncho M.D. personally performed the services described in this documentation, as scribed by Bruce Vasquez in my presence, and it is both accurate and complete.     The note accurately reflects work and decisions made by me.  Moncho Diaz M.D.  9/23/2022  10:26 AM

## 2022-09-23 NOTE — ED TRIAGE NOTES
Chief Complaint   Patient presents with    Jaw Pain    Ear Pain     Pt reports right sided jaw pain that radiates to ear since Sun, pt reports difficulty/pain when he try's to chew. Pt unaware of any dental issues.      Explained to pt triage process, made pt aware to tell this RN/staff of any changes/concerns, pt verbalized understanding of process and instructions given. Pt to ER agnes.

## 2023-11-07 ENCOUNTER — HOSPITAL ENCOUNTER (EMERGENCY)
Facility: MEDICAL CENTER | Age: 33
End: 2023-11-07
Attending: EMERGENCY MEDICINE
Payer: MEDICAID

## 2023-11-07 VITALS
BODY MASS INDEX: 28.67 KG/M2 | SYSTOLIC BLOOD PRESSURE: 125 MMHG | OXYGEN SATURATION: 99 % | DIASTOLIC BLOOD PRESSURE: 74 MMHG | RESPIRATION RATE: 16 BRPM | HEIGHT: 75 IN | TEMPERATURE: 99.7 F | HEART RATE: 70 BPM | WEIGHT: 230.6 LBS

## 2023-11-07 DIAGNOSIS — L03.311 CELLULITIS OF ABDOMINAL WALL: ICD-10-CM

## 2023-11-07 DIAGNOSIS — L08.9 SKIN INFECTION: ICD-10-CM

## 2023-11-07 PROCEDURE — 700102 HCHG RX REV CODE 250 W/ 637 OVERRIDE(OP): Performed by: EMERGENCY MEDICINE

## 2023-11-07 PROCEDURE — A9270 NON-COVERED ITEM OR SERVICE: HCPCS | Performed by: EMERGENCY MEDICINE

## 2023-11-07 PROCEDURE — 99283 EMERGENCY DEPT VISIT LOW MDM: CPT

## 2023-11-07 RX ORDER — CLINDAMYCIN HYDROCHLORIDE 150 MG/1
300 CAPSULE ORAL ONCE
Status: COMPLETED | OUTPATIENT
Start: 2023-11-07 | End: 2023-11-07

## 2023-11-07 RX ORDER — CLINDAMYCIN HYDROCHLORIDE 300 MG/1
300 CAPSULE ORAL 3 TIMES DAILY
Qty: 30 CAPSULE | Refills: 0 | Status: ACTIVE | OUTPATIENT
Start: 2023-11-07 | End: 2023-11-17

## 2023-11-07 RX ADMIN — CLINDAMYCIN HYDROCHLORIDE 300 MG: 150 CAPSULE ORAL at 05:00

## 2023-11-07 NOTE — ED PROVIDER NOTES
"ED Provider Note    Scribed for Daysi Thrasher M.D. by Joyce Bay. 11/7/2023, 4:34 AM.    Primary care provider: Pcp Pt States None  Means of arrival: Walk In   History obtained from: Patient  History limited by: None    CHIEF COMPLAINT  Chief Complaint   Patient presents with    Spider Bite     C/o possible 4 spider bites on his abdomen x 3 days  Redness noted       HPI/ROS  Jacky Covarrubias is a 32 y.o. male who presents to the Emergency Department for bug bite onset 4 days ago. Per patient, there are 4 bites total located to his abdomen. He states that they are red and have been worsening for the past few days. Therefore he came in for further evaluation.  He denies any fevers or chills.  Denies abdominal pain, nausea or vomiting.  The patient denies any drug use.     EXTERNAL RECORDS REVIEWED  None pertinent     LIMITATION TO HISTORY   Select: : None    OUTSIDE HISTORIAN(S):  None      PAST MEDICAL HISTORY   none    SURGICAL HISTORY  patient denies any surgical history    SOCIAL HISTORY  Social History     Tobacco Use    Smoking status: Never    Smokeless tobacco: Never   Vaping Use    Vaping Use: Never used   Substance Use Topics    Alcohol use: Yes     Comment: Socially    Drug use: No      Social History     Substance and Sexual Activity   Drug Use No       FAMILY HISTORY  History reviewed. No pertinent family history.    CURRENT MEDICATIONS  Home Medications       Reviewed by Martín Valdivia R.N. (Registered Nurse) on 11/07/23 at 0419  Med List Status: Partial     Medication Last Dose Status   ondansetron (ZOFRAN ODT) 4 MG TABLET DISPERSIBLE  Active                    ALLERGIES  No Known Allergies    PHYSICAL EXAM  VITAL SIGNS: /76   Pulse 74   Temp 36.4 °C (97.5 °F) (Temporal)   Resp 16   Ht 1.905 m (6' 3\")   Wt 105 kg (230 lb 9.6 oz)   SpO2 99%   BMI 28.82 kg/m²   Nursing note and vitals reviewed.  Constitutional: Well-developed and well-nourished. No acute distress.   HENT: Head " is normocephalic and atraumatic.  Eyes: extra-ocular movements intact  Cardiovascular: Regular rate and regular rhythm.   Pulmonary/Chest: Normal respiratory effort  Abdominal: 4 distinct red marks on abdominal wall with surrounding erythema.   no purulent discharge  Musculoskeletal: Extremities exhibit normal range of motion without edema or tenderness.   Neurological: Awake and alert  Skin: Skin is warm and dry. No rash.     COURSE & MEDICAL DECISION MAKING    ED Observation Status? No; Patient does not meet criteria for ED Observation.     INITIAL ASSESSMENT AND PLAN    Patient is a 32-year-old male who presents for evaluation of abdominal wall erythema.  Differential diagnosis includes cellulitis, abscess, bug bite.  Clinically patient appears to have had bug bites that turned into superimposed bacterial infections.  At this time no evidence of abscess.  He will be treated with clindamycin for cellulitis.  He is amenable to this plan.  He is advised to follow-up for worsening and symptoms and  discharged in stable condition.           ADDITIONAL PROBLEM LIST AND RESOURCE UTILIZATION    Additional problems aside from the chief complaint that I have addressed: none        FINAL IMPRESSION  1. Skin infection    2. Cellulitis of abdominal wall          Joyce ARROYO (Reji), am scribing for, and in the presence of, Daysi Thrasher M.D..    Electronically signed by: Joyce Bay (Reji), 11/7/2023    IDaysi M.D. personally performed the services described in this documentation, as scribed by Joyce Bay in my presence, and it is both accurate and complete.    The note accurately reflects work and decisions made by me.  Daysi Thrasher M.D.  11/7/2023  5:08 AM

## 2023-11-07 NOTE — ED NOTES
Patient discharged from [facility] ED to home with self. Discharge teaching completed at bedside and patient signature obtained. All questions and concerns addressed. All pt belongings with pt at time of discharge.

## 2023-11-07 NOTE — ED TRIAGE NOTES
Jacky Covarrubias  32 y.o.  male  Chief Complaint   Patient presents with    Spider Bite     C/o possible 4 spider bites on his abdomen x 3 days  Redness noted

## 2024-01-23 ENCOUNTER — HOSPITAL ENCOUNTER (EMERGENCY)
Facility: MEDICAL CENTER | Age: 34
End: 2024-01-23
Attending: EMERGENCY MEDICINE

## 2024-01-23 VITALS
SYSTOLIC BLOOD PRESSURE: 140 MMHG | TEMPERATURE: 98 F | RESPIRATION RATE: 17 BRPM | HEIGHT: 75 IN | HEART RATE: 77 BPM | WEIGHT: 233.25 LBS | DIASTOLIC BLOOD PRESSURE: 70 MMHG | OXYGEN SATURATION: 98 % | BODY MASS INDEX: 29 KG/M2

## 2024-01-23 DIAGNOSIS — J06.9 UPPER RESPIRATORY TRACT INFECTION, UNSPECIFIED TYPE: ICD-10-CM

## 2024-01-23 DIAGNOSIS — H10.33 ACUTE CONJUNCTIVITIS OF BOTH EYES, UNSPECIFIED ACUTE CONJUNCTIVITIS TYPE: ICD-10-CM

## 2024-01-23 LAB
FLUAV RNA SPEC QL NAA+PROBE: NEGATIVE
FLUBV RNA SPEC QL NAA+PROBE: NEGATIVE
RSV RNA SPEC QL NAA+PROBE: NEGATIVE
SARS-COV-2 RNA RESP QL NAA+PROBE: NOTDETECTED

## 2024-01-23 PROCEDURE — 0241U HCHG SARS-COV-2 COVID-19 NFCT DS RESP RNA 4 TRGT ED POC: CPT

## 2024-01-23 PROCEDURE — 99283 EMERGENCY DEPT VISIT LOW MDM: CPT

## 2024-01-23 RX ORDER — ERYTHROMYCIN 5 MG/G
1 OINTMENT OPHTHALMIC 3 TIMES DAILY
Qty: 3.5 G | Refills: 0 | Status: ACTIVE | OUTPATIENT
Start: 2024-01-23 | End: 2024-01-28

## 2024-01-23 NOTE — ED TRIAGE NOTES
Chief Complaint   Patient presents with    Red Eye     Both eye redness and mucus discharge since yesterday  He has been using OTC eye drops    Flu Like Symptoms     For 2 days     Pain: 2/10 - throat    Pt came in to triage ambulatory with steady gait for the above complaints.     Pt is alert and oriented x 4, speaking in full sentences, follows commands and responds appropriately to questions.     Respirations are even and unlabored.    Pt placed in lobby. Pt educated on triage process.     Pt encouraged to inform staff for any changes in condition or if needs help while waiting to be room in.    Vitals:    01/23/24 0628   BP: 108/80   Pulse: 82   Resp: 16   Temp: 37.2 °C (99 °F)   SpO2: 94%

## 2024-01-23 NOTE — ED NOTES
Pt ambulated to R10. Pt is A&Ox4 and awake in bed. Pt placed on pulse ox, and automatic BP. VSS, saturating above 95% on RA, heart rate in the 70s. Call light within reach and chart up for ERP.

## 2024-01-23 NOTE — ED PROVIDER NOTES
"ED Provider Note    CHIEF COMPLAINT  Chief Complaint   Patient presents with    Red Eye     Both eye redness and mucus discharge since yesterday  He has been using OTC eye drops    Flu Like Symptoms     For 2 days       EXTERNAL RECORDS REVIEWED  Multiple emergency medicine notes for review    [facility]      Jacky Covarrubias is a 33 y.o. male who presents with complaint of red eyes for 2 days with discharge, no visual changes, eye do not hurt.  Patient does not wear glasses or contact lenses.  Edition antionette symptoms are 2 days with slight cough, body aches, nasal discharge, no fever.  He was around someone who was sick as well.  Patient denies chest pain, nausea, vomiting, she is not vaccinated for influenza or COVID currently.    PAST MEDICAL HISTORY   has a past medical history of Patient denies medical problems.    SURGICAL HISTORY   has a past surgical history that includes no pertinent past surgical history.    FAMILY HISTORY  History reviewed. No pertinent family history.    SOCIAL HISTORY  Social History     Tobacco Use    Smoking status: Never    Smokeless tobacco: Never   Vaping Use    Vaping Use: Never used   Substance and Sexual Activity    Alcohol use: Yes     Comment: Socially    Drug use: No    Sexual activity: Not on file       CURRENT MEDICATIONS  Home Medications       Reviewed by Janell Corley R.N. (Registered Nurse) on 01/23/24 at 0637  Med List Status: Partial     Medication Last Dose Status   ondansetron (ZOFRAN ODT) 4 MG TABLET DISPERSIBLE  Active                    ALLERGIES  No Known Allergies    PHYSICAL EXAM  VITAL SIGNS: BP (!) 146/81   Pulse 75   Temp 37.2 °C (99 °F) (Temporal)   Resp 16   Ht 1.905 m (6' 3\")   Wt 106 kg (233 lb 4 oz)   SpO2 94%   BMI 29.15 kg/m²      Nursing notes and vitals reviewed.  Constitutional: Well developed, Well nourished, No acute distress, Non-toxic appearance.   Eyes: PERRLA, EOMI, scleral and conjunctival injection, no corneal involvement " HENT: Normocephalic, Atraumatic, moist mucous membranes, no facial edema Cardiovascular: Normal heart rate, Normal rhythm, No murmurs, No rubs, No gallops.   Thorax & Lungs: No respiratory distress, No rales, No rhonchi, No wheezing, No chest tenderness.   Skin: Warm, Dry, No erythema, No rash.       DIAGNOSTIC STUDIES / PROCEDURES    LABS  Results for orders placed or performed during the hospital encounter of 01/23/24   POC CoV-2, FLU A/B, RSV by PCR   Result Value Ref Range    POC Influenza A RNA, PCR Negative Negative    POC Influenza B RNA, PCR Negative Negative    POC RSV, by PCR Negative Negative    POC SARS-CoV-2, PCR NotDetected          COURSE & MEDICAL DECISION MAKING    ED Observation Status? No; Patient does not meet criteria for ED Observation.     INITIAL ASSESSMENT, COURSE AND PLAN  Care Narrative: This is a pleasant 33-year-old male presents with a presser infection as well as conjunctivitis.  The patient has known significant eye abnormality except conjunctivitis.  He received erythromycin prescription to follow-up with his [facility].  In addition, patient is not positive for COVID, influenza or RSV and part of the viral condition does not require antiviral medications.    DISPOSITION AND DISCUSSIONS  I have discussed management of the patient with the following physicians and     Decision tools and prescription drugs considered including, but not limited to: Patient does not have a viral condition requiring antiviral medications.  Is not a bacterial infection except conjunctivitis requiring antibiotics.    FINAL DIAGNOSIS  1. Acute conjunctivitis of both eyes, unspecified acute conjunctivitis type    2. Upper respiratory tract infection, unspecified type          DISPOSITION:  Patient will be discharged home in stable condition.    FOLLOW UP:  [facility] Emergency Dept  [facility] [facility] [id number] [phone]    If symptoms worsen      OUTPATIENT  MEDICATIONS:  New Prescriptions    ERYTHROMYCIN 5 MG/GM OINTMENT    Apply 1 Application to both eyes 3 times a day for 5 days.         Electronically signed by: Bayron Rouse D.O., 1/23/2024 7:16 AM

## 2025-07-14 ENCOUNTER — HOSPITAL ENCOUNTER (EMERGENCY)
Facility: MEDICAL CENTER | Age: 35
End: 2025-07-14
Attending: EMERGENCY MEDICINE
Payer: MEDICAID

## 2025-07-14 VITALS
HEIGHT: 75 IN | SYSTOLIC BLOOD PRESSURE: 145 MMHG | OXYGEN SATURATION: 97 % | DIASTOLIC BLOOD PRESSURE: 87 MMHG | RESPIRATION RATE: 15 BRPM | BODY MASS INDEX: 30.78 KG/M2 | HEART RATE: 75 BPM | TEMPERATURE: 96.8 F | WEIGHT: 247.58 LBS

## 2025-07-14 DIAGNOSIS — N34.2 INFECTIVE URETHRITIS: Primary | ICD-10-CM

## 2025-07-14 LAB
AMORPHOUS CRYSTALS 1764: PRESENT /HPF
APPEARANCE UR: ABNORMAL
BACTERIA #/AREA URNS HPF: ABNORMAL /HPF
BILIRUB UR QL STRIP.AUTO: NEGATIVE
C TRACH DNA SPEC QL NAA+PROBE: NEGATIVE
CASTS URNS QL MICRO: ABNORMAL /LPF (ref 0–2)
COLOR UR: YELLOW
EPITHELIAL CELLS 1715: ABNORMAL /HPF (ref 0–5)
GLUCOSE UR STRIP.AUTO-MCNC: NEGATIVE MG/DL
KETONES UR STRIP.AUTO-MCNC: NEGATIVE MG/DL
LEUKOCYTE ESTERASE UR QL STRIP.AUTO: NEGATIVE
MICRO URNS: ABNORMAL
N GONORRHOEA DNA SPEC QL NAA+PROBE: NEGATIVE
NITRITE UR QL STRIP.AUTO: NEGATIVE
PH UR STRIP.AUTO: 8 [PH] (ref 5–8)
PROT UR QL STRIP: NEGATIVE MG/DL
RBC # URNS HPF: ABNORMAL /HPF (ref 0–2)
RBC UR QL AUTO: NEGATIVE
SP GR UR STRIP.AUTO: 1.02
SPECIMEN SOURCE: NORMAL
UROBILINOGEN UR STRIP.AUTO-MCNC: 0.2 EU/DL
WBC #/AREA URNS HPF: ABNORMAL /HPF

## 2025-07-14 PROCEDURE — 700102 HCHG RX REV CODE 250 W/ 637 OVERRIDE(OP): Mod: UD | Performed by: EMERGENCY MEDICINE

## 2025-07-14 PROCEDURE — 87591 N.GONORRHOEAE DNA AMP PROB: CPT

## 2025-07-14 PROCEDURE — 700111 HCHG RX REV CODE 636 W/ 250 OVERRIDE (IP): Mod: JZ,UD

## 2025-07-14 PROCEDURE — A9270 NON-COVERED ITEM OR SERVICE: HCPCS | Mod: UD | Performed by: EMERGENCY MEDICINE

## 2025-07-14 PROCEDURE — 87491 CHLMYD TRACH DNA AMP PROBE: CPT

## 2025-07-14 PROCEDURE — 96372 THER/PROPH/DIAG INJ SC/IM: CPT

## 2025-07-14 PROCEDURE — 700111 HCHG RX REV CODE 636 W/ 250 OVERRIDE (IP): Mod: JZ,UD | Performed by: EMERGENCY MEDICINE

## 2025-07-14 PROCEDURE — 81001 URINALYSIS AUTO W/SCOPE: CPT

## 2025-07-14 PROCEDURE — 99284 EMERGENCY DEPT VISIT MOD MDM: CPT

## 2025-07-14 RX ORDER — AZITHROMYCIN 250 MG/1
1000 TABLET, FILM COATED ORAL ONCE
Status: COMPLETED | OUTPATIENT
Start: 2025-07-14 | End: 2025-07-14

## 2025-07-14 RX ORDER — CEFTRIAXONE 500 MG/1
500 INJECTION, POWDER, FOR SOLUTION INTRAMUSCULAR; INTRAVENOUS ONCE
Status: COMPLETED | OUTPATIENT
Start: 2025-07-14 | End: 2025-07-14

## 2025-07-14 RX ADMIN — LIDOCAINE HYDROCHLORIDE 1 ML: 10 INJECTION, SOLUTION EPIDURAL; INFILTRATION; INTRACAUDAL; PERINEURAL at 17:42

## 2025-07-14 RX ADMIN — CEFTRIAXONE SODIUM 500 MG: 500 INJECTION, POWDER, FOR SOLUTION INTRAMUSCULAR; INTRAVENOUS at 17:42

## 2025-07-14 RX ADMIN — AZITHROMYCIN DIHYDRATE 1000 MG: 250 TABLET ORAL at 17:41

## 2025-07-14 NOTE — ED NOTES
Pt assisted to room with steady gait. Pt appears sleepy, stating he didn't sleep last night due to dry heaving. Agree with triage note. Chart up for ERP.

## 2025-07-14 NOTE — ED TRIAGE NOTES
".  Chief Complaint   Patient presents with    Blood in Urine     Headache, nausea, blood in urine. Denies fevers, flank pain.        33 yo male ambulatory to triage for above complaint. Dysuria protocol placed, given cup for sample.      Pt is alert and oriented, speaking in full sentences, follows commands and responds appropriately to questions.      Patient placed back in lobby and educated on triage process. Asked to inform RN of any changes or concerns.     BP (!) 155/90   Pulse 77   Temp 35.9 °C (96.7 °F) (Temporal)   Resp 14   Ht 1.905 m (6' 3\")   Wt 112 kg (247 lb 9.2 oz)   SpO2 96%   BMI 30.94 kg/m²     "

## 2025-07-15 NOTE — DISCHARGE INSTRUCTIONS
Practice safe sex with barrier protection such as condoms.  Follow-up on [facility] for results of your gonorrhea and Chlamydia testing.  You have been treated for both of these today

## 2025-07-15 NOTE — ED NOTES
Pt discharged to lobby with steady gait. GCS 15. Pt in possession of belongings. Pt provided discharge education and information pertaining to medications and follow up appointments. Pt received copy of discharge instructions and verbalized understanding.     Vitals:    07/14/25 1745   BP: (!) 145/87   Pulse: 75   Resp: 15   Temp: 36 °C (96.8 °F)   SpO2: 97%